# Patient Record
Sex: MALE | Race: WHITE | NOT HISPANIC OR LATINO | URBAN - METROPOLITAN AREA
[De-identification: names, ages, dates, MRNs, and addresses within clinical notes are randomized per-mention and may not be internally consistent; named-entity substitution may affect disease eponyms.]

---

## 2022-02-11 ENCOUNTER — PREPPED CHART (OUTPATIENT)
Dept: URBAN - METROPOLITAN AREA CLINIC 66 | Facility: CLINIC | Age: 82
End: 2022-02-11

## 2022-02-11 PROBLEM — H35.373 EPIRETINAL MEMBRANE: Noted: 2022-02-11

## 2022-02-11 PROBLEM — H35.373 EPIRETINAL MEMBRANE: Status: STABILIZING | Noted: 2022-02-11

## 2022-02-11 PROBLEM — H04.123 DRY EYE SYNDROME: Noted: 2022-02-11

## 2022-02-11 PROBLEM — H40.053 GLAUCOMA SUSPECT: Noted: 2022-02-11

## 2022-02-11 PROBLEM — G90.2 HORNER'S SYNDROME: Noted: 2022-02-11

## 2022-02-11 PROBLEM — H44.23 DEGENERATIVE MYOPIA: Status: STABILIZING | Noted: 2022-02-11

## 2022-02-11 PROBLEM — H35.3112 DRY AMD, INTERMEDIATE DRY STAGE: Status: STABILIZING | Noted: 2022-02-11

## 2022-02-11 PROBLEM — H44.23 DEGENERATIVE MYOPIA: Noted: 2022-02-11

## 2022-02-11 PROBLEM — H35.3221 NEOVASCULAR AMD WITH ACTIVE CNV: Status: IMPROVING | Noted: 2022-02-11

## 2022-02-11 PROBLEM — H35.3221 NEOVASCULAR AMD WITH ACTIVE CNV: Status: STABILIZING | Noted: 2022-02-11

## 2022-02-11 PROBLEM — H35.3112 DRY AMD, INTERMEDIATE DRY STAGE: Noted: 2022-02-11

## 2022-02-11 PROBLEM — H35.3221 NEOVASCULAR AMD WITH ACTIVE CNV: Noted: 2022-02-11

## 2022-03-06 ASSESSMENT — TONOMETRY
OD_IOP_MMHG: 18
OS_IOP_MMHG: 15

## 2022-03-06 ASSESSMENT — VISUAL ACUITY
OS_CC: 20/50-2
OD_CC: 20/60-2

## 2022-03-11 ENCOUNTER — COMPLETE EYE EXAM (OUTPATIENT)
Dept: URBAN - METROPOLITAN AREA CLINIC 66 | Facility: CLINIC | Age: 82
End: 2022-03-11

## 2022-03-11 DIAGNOSIS — H35.373: ICD-10-CM

## 2022-03-11 DIAGNOSIS — H44.23: ICD-10-CM

## 2022-03-11 DIAGNOSIS — H35.3221: ICD-10-CM

## 2022-03-11 DIAGNOSIS — H35.3112: ICD-10-CM

## 2022-03-11 PROCEDURE — 92202 OPSCPY EXTND ON/MAC DRAW: CPT | Mod: 59

## 2022-03-11 PROCEDURE — 67028 INJECTION EYE DRUG: CPT

## 2022-03-11 PROCEDURE — 99214 OFFICE O/P EST MOD 30 MIN: CPT | Mod: 25

## 2022-03-11 PROCEDURE — PFS EYLEA PFS

## 2022-03-11 PROCEDURE — 92134 CPTRZ OPH DX IMG PST SGM RTA: CPT

## 2022-03-11 ASSESSMENT — TONOMETRY
OS_IOP_MMHG: 12
OD_IOP_MMHG: 19

## 2022-03-11 ASSESSMENT — VISUAL ACUITY
OS_SC: 20/50
OD_SC: 20/70

## 2022-04-08 ENCOUNTER — FOLLOW UP (OUTPATIENT)
Dept: URBAN - METROPOLITAN AREA CLINIC 66 | Facility: CLINIC | Age: 82
End: 2022-04-08

## 2022-04-08 DIAGNOSIS — H35.3221: ICD-10-CM

## 2022-04-08 DIAGNOSIS — H35.3112: ICD-10-CM

## 2022-04-08 DIAGNOSIS — H35.373: ICD-10-CM

## 2022-04-08 DIAGNOSIS — H44.23: ICD-10-CM

## 2022-04-08 PROCEDURE — 92202 OPSCPY EXTND ON/MAC DRAW: CPT | Mod: 59

## 2022-04-08 PROCEDURE — 67028 INJECTION EYE DRUG: CPT

## 2022-04-08 PROCEDURE — 92134 CPTRZ OPH DX IMG PST SGM RTA: CPT

## 2022-04-08 PROCEDURE — 92014 COMPRE OPH EXAM EST PT 1/>: CPT | Mod: 25

## 2022-04-08 PROCEDURE — PFS EYLEA PFS

## 2022-04-08 ASSESSMENT — VISUAL ACUITY
OD_CC: 20/60-2
OS_CC: 20/30-2

## 2022-04-08 ASSESSMENT — TONOMETRY
OD_IOP_MMHG: 21
OS_IOP_MMHG: 17

## 2022-05-13 ENCOUNTER — FOLLOW UP (OUTPATIENT)
Dept: URBAN - METROPOLITAN AREA CLINIC 66 | Facility: CLINIC | Age: 82
End: 2022-05-13

## 2022-05-13 DIAGNOSIS — H35.3221: ICD-10-CM

## 2022-05-13 DIAGNOSIS — H35.373: ICD-10-CM

## 2022-05-13 DIAGNOSIS — H35.3112: ICD-10-CM

## 2022-05-13 DIAGNOSIS — H44.23: ICD-10-CM

## 2022-05-13 PROCEDURE — 67028 INJECTION EYE DRUG: CPT

## 2022-05-13 PROCEDURE — 92202 OPSCPY EXTND ON/MAC DRAW: CPT | Mod: 59,RT

## 2022-05-13 PROCEDURE — 99214 OFFICE O/P EST MOD 30 MIN: CPT | Mod: 25

## 2022-05-13 PROCEDURE — 92134 CPTRZ OPH DX IMG PST SGM RTA: CPT

## 2022-05-13 PROCEDURE — PFS EYLEA PFS

## 2022-05-13 ASSESSMENT — TONOMETRY
OS_IOP_MMHG: 14
OD_IOP_MMHG: 16

## 2022-05-13 ASSESSMENT — VISUAL ACUITY
OD_CC: 20/50-2
OS_CC: 20/30

## 2022-06-28 ENCOUNTER — FOLLOW UP (OUTPATIENT)
Dept: URBAN - METROPOLITAN AREA CLINIC 66 | Facility: CLINIC | Age: 82
End: 2022-06-28

## 2022-06-28 DIAGNOSIS — H35.373: ICD-10-CM

## 2022-06-28 DIAGNOSIS — H35.3112: ICD-10-CM

## 2022-06-28 DIAGNOSIS — H35.3221: ICD-10-CM

## 2022-06-28 DIAGNOSIS — H44.23: ICD-10-CM

## 2022-06-28 PROCEDURE — 92202 OPSCPY EXTND ON/MAC DRAW: CPT | Mod: 59

## 2022-06-28 PROCEDURE — PFS EYLEA PFS

## 2022-06-28 PROCEDURE — 92134 CPTRZ OPH DX IMG PST SGM RTA: CPT

## 2022-06-28 PROCEDURE — 99214 OFFICE O/P EST MOD 30 MIN: CPT | Mod: 25

## 2022-06-28 PROCEDURE — 67028 INJECTION EYE DRUG: CPT

## 2022-06-28 ASSESSMENT — VISUAL ACUITY
OS_CC: 20/25+2
OD_CC: 20/50-1

## 2022-06-28 ASSESSMENT — TONOMETRY
OS_IOP_MMHG: 13
OD_IOP_MMHG: 18

## 2022-08-10 ENCOUNTER — FOLLOW UP (OUTPATIENT)
Dept: URBAN - METROPOLITAN AREA CLINIC 66 | Facility: CLINIC | Age: 82
End: 2022-08-10

## 2022-08-10 DIAGNOSIS — H35.373: ICD-10-CM

## 2022-08-10 DIAGNOSIS — H35.3221: ICD-10-CM

## 2022-08-10 DIAGNOSIS — H44.23: ICD-10-CM

## 2022-08-10 DIAGNOSIS — H35.3112: ICD-10-CM

## 2022-08-10 PROCEDURE — 67028 INJECTION EYE DRUG: CPT

## 2022-08-10 PROCEDURE — 99214 OFFICE O/P EST MOD 30 MIN: CPT | Mod: 25

## 2022-08-10 PROCEDURE — 92134 CPTRZ OPH DX IMG PST SGM RTA: CPT

## 2022-08-10 PROCEDURE — PFS EYLEA PFS

## 2022-08-10 PROCEDURE — 92202 OPSCPY EXTND ON/MAC DRAW: CPT | Mod: 59

## 2022-08-10 ASSESSMENT — VISUAL ACUITY
OD_CC: 20/70
OD_PH: 20/60-2
OS_CC: 20/20-2

## 2022-08-10 ASSESSMENT — TONOMETRY
OD_IOP_MMHG: 16
OS_IOP_MMHG: 12

## 2022-08-29 PROBLEM — Z12.11 SCREENING COLON: Status: ACTIVE | Noted: 2022-08-29

## 2022-10-05 ENCOUNTER — FOLLOW UP (OUTPATIENT)
Dept: URBAN - METROPOLITAN AREA CLINIC 66 | Facility: CLINIC | Age: 82
End: 2022-10-05

## 2022-10-05 DIAGNOSIS — H35.3221: ICD-10-CM

## 2022-10-05 DIAGNOSIS — H35.373: ICD-10-CM

## 2022-10-05 DIAGNOSIS — H44.23: ICD-10-CM

## 2022-10-05 DIAGNOSIS — H35.3112: ICD-10-CM

## 2022-10-05 PROCEDURE — PFS EYLEA PFS

## 2022-10-05 PROCEDURE — 92134 CPTRZ OPH DX IMG PST SGM RTA: CPT

## 2022-10-05 PROCEDURE — 99214 OFFICE O/P EST MOD 30 MIN: CPT | Mod: 25

## 2022-10-05 PROCEDURE — 92202 OPSCPY EXTND ON/MAC DRAW: CPT | Mod: 59

## 2022-10-05 PROCEDURE — 67028 INJECTION EYE DRUG: CPT

## 2022-10-05 ASSESSMENT — TONOMETRY
OS_IOP_MMHG: 12
OD_IOP_MMHG: 14

## 2022-10-05 ASSESSMENT — VISUAL ACUITY
OS_CC: 20/30-1
OD_CC: 20/70-3

## 2022-11-30 ENCOUNTER — FOLLOW UP (OUTPATIENT)
Dept: URBAN - METROPOLITAN AREA CLINIC 66 | Facility: CLINIC | Age: 82
End: 2022-11-30

## 2022-11-30 DIAGNOSIS — H35.373: ICD-10-CM

## 2022-11-30 DIAGNOSIS — H35.3112: ICD-10-CM

## 2022-11-30 DIAGNOSIS — H35.3221: ICD-10-CM

## 2022-11-30 DIAGNOSIS — H44.23: ICD-10-CM

## 2022-11-30 PROCEDURE — 92134 CPTRZ OPH DX IMG PST SGM RTA: CPT

## 2022-11-30 PROCEDURE — 99214 OFFICE O/P EST MOD 30 MIN: CPT | Mod: 25

## 2022-11-30 PROCEDURE — 92202 OPSCPY EXTND ON/MAC DRAW: CPT | Mod: 59

## 2022-11-30 PROCEDURE — PFS EYLEA PFS

## 2022-11-30 PROCEDURE — 67028 INJECTION EYE DRUG: CPT

## 2022-11-30 ASSESSMENT — VISUAL ACUITY
OD_PH: 20/70-2
OD_CC: 20/80+2
OS_CC: 20/50
OS_PH: 20/40-2

## 2022-11-30 ASSESSMENT — TONOMETRY
OD_IOP_MMHG: 16
OS_IOP_MMHG: 12

## 2023-02-02 ENCOUNTER — OFFICE (OUTPATIENT)
Dept: URBAN - METROPOLITAN AREA CLINIC 79 | Facility: CLINIC | Age: 83
End: 2023-02-02
Payer: OTHER GOVERNMENT

## 2023-02-02 ENCOUNTER — OFFICE (OUTPATIENT)
Dept: URBAN - METROPOLITAN AREA CLINIC 79 | Facility: CLINIC | Age: 83
End: 2023-02-02

## 2023-02-02 VITALS
WEIGHT: 239 LBS | TEMPERATURE: 98.1 F | HEART RATE: 74 BPM | HEIGHT: 69 IN | DIASTOLIC BLOOD PRESSURE: 81 MMHG | SYSTOLIC BLOOD PRESSURE: 145 MMHG

## 2023-02-02 DIAGNOSIS — Z86.010 PERSONAL HISTORY OF COLONIC POLYPS: ICD-10-CM

## 2023-02-02 DIAGNOSIS — K59.00 CONSTIPATION, UNSPECIFIED: ICD-10-CM

## 2023-02-02 DIAGNOSIS — I25.119 ATHEROSCLEROTIC HEART DISEASE OF NATIVE CORONARY ARTERY WITH: ICD-10-CM

## 2023-02-02 DIAGNOSIS — G47.33 OBSTRUCTIVE SLEEP APNEA (ADULT) (PEDIATRIC): ICD-10-CM

## 2023-02-02 PROCEDURE — 99204 OFFICE O/P NEW MOD 45 MIN: CPT | Performed by: PHYSICIAN ASSISTANT

## 2023-02-02 PROCEDURE — 00031: CPT | Performed by: INTERNAL MEDICINE

## 2023-02-02 NOTE — SERVICEHPINOTES
WILLOW GARCIA   is a   83 yo white male who is being seen in consultation at the request of   SHARONDA ARMSTRONG   for ov prior to colonoscopy.  He informs h/o chronic constipation given BMs qod, BSS type 3-5 : No blood in stool or BRBPR. He is on OTC Dulcolax 1 pill q aM and 2 pills q hs. He is also on fiber supplement that seems to help. Last colonoscopy in 03/2015 normal colon w/ recall advised in 7 yrs due to h/o colonic polyps. He is followed by cardiologist Dr Gianfranco Renee due to h/o CAD s/p CABG in 2000 Last seen in in 11/2022. No known pulmonary disease. Denies chest pain, n/v, abdominal pain, change in BMs, diarrhea, melena, weight loss. No other complaints. br

## 2023-02-08 ENCOUNTER — ON CAMPUS - OUTPATIENT (OUTPATIENT)
Dept: URBAN - METROPOLITAN AREA HOSPITAL 16 | Facility: HOSPITAL | Age: 83
End: 2023-02-08
Payer: MEDICARE

## 2023-02-08 DIAGNOSIS — Z86.010 PERSONAL HISTORY OF COLONIC POLYPS: ICD-10-CM

## 2023-02-08 PROCEDURE — G0105 COLORECTAL SCRN; HI RISK IND: HCPCS | Performed by: INTERNAL MEDICINE

## 2023-02-22 ENCOUNTER — FOLLOW UP (OUTPATIENT)
Dept: URBAN - METROPOLITAN AREA CLINIC 66 | Facility: CLINIC | Age: 83
End: 2023-02-22

## 2023-02-22 DIAGNOSIS — H35.3221: ICD-10-CM

## 2023-02-22 DIAGNOSIS — H44.23: ICD-10-CM

## 2023-02-22 DIAGNOSIS — H35.373: ICD-10-CM

## 2023-02-22 PROCEDURE — 92202 OPSCPY EXTND ON/MAC DRAW: CPT

## 2023-02-22 PROCEDURE — 92134 CPTRZ OPH DX IMG PST SGM RTA: CPT

## 2023-02-22 PROCEDURE — 92014 COMPRE OPH EXAM EST PT 1/>: CPT

## 2023-02-22 ASSESSMENT — VISUAL ACUITY
OD_PH: 20/70+2
OS_CC: 20/25+2
OD_CC: 20/100+2

## 2023-02-22 ASSESSMENT — TONOMETRY
OD_IOP_MMHG: 19
OS_IOP_MMHG: 18

## 2023-03-29 ENCOUNTER — FOLLOW UP (OUTPATIENT)
Dept: URBAN - METROPOLITAN AREA CLINIC 66 | Facility: CLINIC | Age: 83
End: 2023-03-29

## 2023-03-29 DIAGNOSIS — H44.2A2: ICD-10-CM

## 2023-03-29 DIAGNOSIS — H44.23: ICD-10-CM

## 2023-03-29 DIAGNOSIS — H35.3221: ICD-10-CM

## 2023-03-29 DIAGNOSIS — H35.373: ICD-10-CM

## 2023-03-29 PROCEDURE — 92014 COMPRE OPH EXAM EST PT 1/>: CPT

## 2023-03-29 PROCEDURE — 92202 OPSCPY EXTND ON/MAC DRAW: CPT

## 2023-03-31 ASSESSMENT — VISUAL ACUITY
OD_CC: 20/100+2
OS_CC: 20/25-2

## 2023-03-31 ASSESSMENT — TONOMETRY: OS_IOP_MMHG: 14

## 2023-05-03 ENCOUNTER — FOLLOW UP (OUTPATIENT)
Dept: URBAN - METROPOLITAN AREA CLINIC 66 | Facility: CLINIC | Age: 83
End: 2023-05-03

## 2023-05-03 DIAGNOSIS — H01.023: ICD-10-CM

## 2023-05-03 DIAGNOSIS — H35.3221: ICD-10-CM

## 2023-05-03 DIAGNOSIS — H01.026: ICD-10-CM

## 2023-05-03 DIAGNOSIS — H44.2A2: ICD-10-CM

## 2023-05-03 DIAGNOSIS — H44.23: ICD-10-CM

## 2023-05-03 PROCEDURE — PFS EYLEA PFS

## 2023-05-03 PROCEDURE — 92134 CPTRZ OPH DX IMG PST SGM RTA: CPT

## 2023-05-03 PROCEDURE — 99214 OFFICE O/P EST MOD 30 MIN: CPT | Mod: 25

## 2023-05-03 PROCEDURE — 67028 INJECTION EYE DRUG: CPT

## 2023-05-03 ASSESSMENT — VISUAL ACUITY
OS_PH: 20/60-1
OD_CC: 20/80+1
OS_CC: 20/60-2
OD_PH: 20/70+1

## 2023-05-03 ASSESSMENT — TONOMETRY
OS_IOP_MMHG: 11
OD_IOP_MMHG: 16

## 2023-06-28 ENCOUNTER — FOLLOW UP (OUTPATIENT)
Dept: URBAN - METROPOLITAN AREA CLINIC 66 | Facility: CLINIC | Age: 83
End: 2023-06-28

## 2023-06-28 DIAGNOSIS — H01.023: ICD-10-CM

## 2023-06-28 DIAGNOSIS — H01.026: ICD-10-CM

## 2023-06-28 DIAGNOSIS — H35.3221: ICD-10-CM

## 2023-06-28 DIAGNOSIS — H44.23: ICD-10-CM

## 2023-06-28 DIAGNOSIS — H44.2A2: ICD-10-CM

## 2023-06-28 PROCEDURE — 92202 OPSCPY EXTND ON/MAC DRAW: CPT

## 2023-06-28 PROCEDURE — 92134 CPTRZ OPH DX IMG PST SGM RTA: CPT

## 2023-06-28 PROCEDURE — 92014 COMPRE OPH EXAM EST PT 1/>: CPT

## 2023-06-28 ASSESSMENT — VISUAL ACUITY
OS_CC: 20/30
OD_CC: 20/60

## 2023-06-28 ASSESSMENT — TONOMETRY
OD_IOP_MMHG: 16
OS_IOP_MMHG: 13

## 2023-08-04 ENCOUNTER — FOLLOW UP (OUTPATIENT)
Dept: URBAN - METROPOLITAN AREA CLINIC 66 | Facility: CLINIC | Age: 83
End: 2023-08-04

## 2023-08-04 DIAGNOSIS — H44.23: ICD-10-CM

## 2023-08-04 DIAGNOSIS — H35.373: ICD-10-CM

## 2023-08-04 DIAGNOSIS — H00.023: ICD-10-CM

## 2023-08-04 DIAGNOSIS — H44.2A2: ICD-10-CM

## 2023-08-04 DIAGNOSIS — H35.3221: ICD-10-CM

## 2023-08-04 PROCEDURE — 92134 CPTRZ OPH DX IMG PST SGM RTA: CPT

## 2023-08-04 PROCEDURE — 67028 INJECTION EYE DRUG: CPT

## 2023-08-04 PROCEDURE — 92014 COMPRE OPH EXAM EST PT 1/>: CPT | Mod: 25

## 2023-08-04 PROCEDURE — 92202 OPSCPY EXTND ON/MAC DRAW: CPT | Mod: 59

## 2023-08-04 PROCEDURE — PFS EYLEA PFS: Mod: JZ

## 2023-08-04 ASSESSMENT — TONOMETRY
OS_IOP_MMHG: 14
OD_IOP_MMHG: 16

## 2023-08-04 ASSESSMENT — VISUAL ACUITY
OS_CC: 20/30-2
OD_CC: 20/60-1

## 2023-10-18 ENCOUNTER — INJECTION ONLY (OUTPATIENT)
Dept: URBAN - METROPOLITAN AREA CLINIC 66 | Facility: CLINIC | Age: 83
End: 2023-10-18

## 2023-10-18 DIAGNOSIS — H35.3221: ICD-10-CM

## 2023-10-18 DIAGNOSIS — H44.23: ICD-10-CM

## 2023-10-18 PROCEDURE — PFS EYLEA PFS: Mod: JZ

## 2023-10-18 PROCEDURE — 67028 INJECTION EYE DRUG: CPT

## 2023-10-18 PROCEDURE — 92134 CPTRZ OPH DX IMG PST SGM RTA: CPT

## 2023-10-18 ASSESSMENT — VISUAL ACUITY
OS_CC: 20/30+1
OD_CC: 20/50-2

## 2023-10-18 ASSESSMENT — TONOMETRY
OD_IOP_MMHG: 14
OS_IOP_MMHG: 14

## 2023-12-13 ENCOUNTER — FOLLOW UP (OUTPATIENT)
Dept: URBAN - METROPOLITAN AREA CLINIC 66 | Facility: CLINIC | Age: 83
End: 2023-12-13

## 2023-12-13 DIAGNOSIS — H00.023: ICD-10-CM

## 2023-12-13 DIAGNOSIS — H35.373: ICD-10-CM

## 2023-12-13 DIAGNOSIS — H35.3221: ICD-10-CM

## 2023-12-13 DIAGNOSIS — H44.2A2: ICD-10-CM

## 2023-12-13 DIAGNOSIS — H44.23: ICD-10-CM

## 2023-12-13 PROCEDURE — PFS EYLEA PFS: Mod: JZ

## 2023-12-13 PROCEDURE — 67028 INJECTION EYE DRUG: CPT

## 2023-12-13 PROCEDURE — 92134 CPTRZ OPH DX IMG PST SGM RTA: CPT

## 2023-12-13 PROCEDURE — 92014 COMPRE OPH EXAM EST PT 1/>: CPT | Mod: 25

## 2023-12-13 PROCEDURE — 92202 OPSCPY EXTND ON/MAC DRAW: CPT | Mod: 59

## 2023-12-13 ASSESSMENT — TONOMETRY
OS_IOP_MMHG: 15
OD_IOP_MMHG: 19

## 2023-12-13 ASSESSMENT — VISUAL ACUITY
OS_CC: 20/40-1
OS_PH: 20/40+2
OD_CC: 20/60+2

## 2024-02-21 ENCOUNTER — FOLLOW UP (OUTPATIENT)
Dept: URBAN - METROPOLITAN AREA CLINIC 66 | Facility: CLINIC | Age: 84
End: 2024-02-21

## 2024-02-21 DIAGNOSIS — H44.2A2: ICD-10-CM

## 2024-02-21 DIAGNOSIS — H35.373: ICD-10-CM

## 2024-02-21 DIAGNOSIS — H35.3221: ICD-10-CM

## 2024-02-21 DIAGNOSIS — H44.23: ICD-10-CM

## 2024-02-21 DIAGNOSIS — H00.023: ICD-10-CM

## 2024-02-21 PROCEDURE — 67028 INJECTION EYE DRUG: CPT

## 2024-02-21 PROCEDURE — 92134 CPTRZ OPH DX IMG PST SGM RTA: CPT

## 2024-02-21 PROCEDURE — PFS EYLEA PFS: Mod: JZ

## 2024-02-21 PROCEDURE — 92014 COMPRE OPH EXAM EST PT 1/>: CPT | Mod: 25

## 2024-02-21 PROCEDURE — 92202 OPSCPY EXTND ON/MAC DRAW: CPT | Mod: 59

## 2024-02-21 ASSESSMENT — TONOMETRY
OD_IOP_MMHG: 18
OS_IOP_MMHG: 16

## 2024-02-21 ASSESSMENT — VISUAL ACUITY
OS_PH: 20/30-2
OS_CC: 20/40+2
OD_CC: 20/60+2

## 2024-05-03 ENCOUNTER — FOLLOW UP (OUTPATIENT)
Dept: URBAN - METROPOLITAN AREA CLINIC 66 | Facility: CLINIC | Age: 84
End: 2024-05-03

## 2024-05-03 DIAGNOSIS — H44.23: ICD-10-CM

## 2024-05-03 DIAGNOSIS — H44.2A2: ICD-10-CM

## 2024-05-03 DIAGNOSIS — H35.3221: ICD-10-CM

## 2024-05-03 DIAGNOSIS — H35.373: ICD-10-CM

## 2024-05-03 DIAGNOSIS — H04.123: ICD-10-CM

## 2024-05-03 DIAGNOSIS — H00.023: ICD-10-CM

## 2024-05-03 PROCEDURE — 67028 INJECTION EYE DRUG: CPT

## 2024-05-03 PROCEDURE — 92201 OPSCPY EXTND RTA DRAW UNI/BI: CPT | Mod: NC

## 2024-05-03 PROCEDURE — 92014 COMPRE OPH EXAM EST PT 1/>: CPT | Mod: 25

## 2024-05-03 PROCEDURE — 92134 CPTRZ OPH DX IMG PST SGM RTA: CPT

## 2024-05-03 PROCEDURE — PFS EYLEA PFS: Mod: JZ

## 2024-05-03 ASSESSMENT — VISUAL ACUITY
OS_CC: 20/40+2
OD_CC: 20/70+2

## 2024-05-03 ASSESSMENT — TONOMETRY
OD_IOP_MMHG: 17
OS_IOP_MMHG: 14

## 2024-08-07 ENCOUNTER — FOLLOW UP (OUTPATIENT)
Dept: URBAN - METROPOLITAN AREA CLINIC 66 | Facility: CLINIC | Age: 84
End: 2024-08-07

## 2024-08-07 DIAGNOSIS — H44.2A2: ICD-10-CM

## 2024-08-07 DIAGNOSIS — H44.23: ICD-10-CM

## 2024-08-07 DIAGNOSIS — H35.373: ICD-10-CM

## 2024-08-07 DIAGNOSIS — H00.023: ICD-10-CM

## 2024-08-07 DIAGNOSIS — H04.123: ICD-10-CM

## 2024-08-07 DIAGNOSIS — H35.3221: ICD-10-CM

## 2024-08-07 PROCEDURE — 92014 COMPRE OPH EXAM EST PT 1/>: CPT | Mod: 25

## 2024-08-07 PROCEDURE — 92134 CPTRZ OPH DX IMG PST SGM RTA: CPT

## 2024-08-07 PROCEDURE — 67028 INJECTION EYE DRUG: CPT

## 2024-08-07 PROCEDURE — 92202 OPSCPY EXTND ON/MAC DRAW: CPT | Mod: 59

## 2024-08-07 PROCEDURE — PFS EYLEA PFS: Mod: JZ

## 2024-08-07 ASSESSMENT — TONOMETRY
OD_IOP_MMHG: 16
OS_IOP_MMHG: 14

## 2024-08-07 ASSESSMENT — VISUAL ACUITY
OS_CC: 20/60-1
OD_CC: 20/70-1

## 2024-10-16 ENCOUNTER — FOLLOW UP (OUTPATIENT)
Dept: URBAN - METROPOLITAN AREA CLINIC 66 | Facility: CLINIC | Age: 84
End: 2024-10-16

## 2024-10-16 DIAGNOSIS — H04.123: ICD-10-CM

## 2024-10-16 DIAGNOSIS — H00.023: ICD-10-CM

## 2024-10-16 DIAGNOSIS — H35.373: ICD-10-CM

## 2024-10-16 DIAGNOSIS — H44.23: ICD-10-CM

## 2024-10-16 DIAGNOSIS — H44.2A2: ICD-10-CM

## 2024-10-16 DIAGNOSIS — H35.3221: ICD-10-CM

## 2024-10-16 PROCEDURE — 92014 COMPRE OPH EXAM EST PT 1/>: CPT | Mod: 25

## 2024-10-16 PROCEDURE — PFS EYLEA PFS: Mod: JZ

## 2024-10-16 PROCEDURE — 67028 INJECTION EYE DRUG: CPT

## 2024-10-16 PROCEDURE — 92202 OPSCPY EXTND ON/MAC DRAW: CPT | Mod: 59

## 2024-10-16 PROCEDURE — 92134 CPTRZ OPH DX IMG PST SGM RTA: CPT

## 2024-10-16 ASSESSMENT — TONOMETRY
OS_IOP_MMHG: 11
OD_IOP_MMHG: 13

## 2024-10-16 ASSESSMENT — VISUAL ACUITY
OS_CC: 20/40-1
OD_CC: 20/80-1

## 2024-12-18 ENCOUNTER — FOLLOW UP (OUTPATIENT)
Dept: URBAN - METROPOLITAN AREA CLINIC 66 | Facility: CLINIC | Age: 84
End: 2024-12-18

## 2024-12-18 DIAGNOSIS — H35.373: ICD-10-CM

## 2024-12-18 DIAGNOSIS — H35.3221: ICD-10-CM

## 2024-12-18 DIAGNOSIS — H44.23: ICD-10-CM

## 2024-12-18 DIAGNOSIS — H44.2A2: ICD-10-CM

## 2024-12-18 PROCEDURE — 67028 INJECTION EYE DRUG: CPT

## 2024-12-18 PROCEDURE — PFS EYLEA PFS: Mod: JZ

## 2024-12-18 PROCEDURE — 92134 CPTRZ OPH DX IMG PST SGM RTA: CPT

## 2024-12-18 PROCEDURE — 92014 COMPRE OPH EXAM EST PT 1/>: CPT | Mod: 25

## 2024-12-18 PROCEDURE — 92202 OPSCPY EXTND ON/MAC DRAW: CPT | Mod: 59

## 2024-12-18 ASSESSMENT — VISUAL ACUITY
OS_CC: 20/30-2
OD_CC: 20/70-2

## 2024-12-18 ASSESSMENT — TONOMETRY
OD_IOP_MMHG: 11
OS_IOP_MMHG: 11

## 2025-03-05 ENCOUNTER — FOLLOW UP (OUTPATIENT)
Dept: URBAN - METROPOLITAN AREA CLINIC 66 | Facility: CLINIC | Age: 85
End: 2025-03-05

## 2025-03-05 DIAGNOSIS — H44.2A2: ICD-10-CM

## 2025-03-05 DIAGNOSIS — H35.3221: ICD-10-CM

## 2025-03-05 DIAGNOSIS — H44.23: ICD-10-CM

## 2025-03-05 DIAGNOSIS — H35.373: ICD-10-CM

## 2025-03-05 PROCEDURE — PFS EYLEA PFS: Mod: JZ

## 2025-03-05 PROCEDURE — 67028 INJECTION EYE DRUG: CPT

## 2025-03-05 PROCEDURE — 92014 COMPRE OPH EXAM EST PT 1/>: CPT | Mod: 25

## 2025-03-05 PROCEDURE — 92134 CPTRZ OPH DX IMG PST SGM RTA: CPT

## 2025-03-05 PROCEDURE — 92202 OPSCPY EXTND ON/MAC DRAW: CPT | Mod: 59

## 2025-03-05 ASSESSMENT — VISUAL ACUITY
OD_CC: 20/80+1
OS_CC: 20/30+1
OD_PH: 20/70+1

## 2025-03-05 ASSESSMENT — TONOMETRY
OS_IOP_MMHG: 12
OD_IOP_MMHG: 15